# Patient Record
Sex: FEMALE | HISPANIC OR LATINO | URBAN - METROPOLITAN AREA
[De-identification: names, ages, dates, MRNs, and addresses within clinical notes are randomized per-mention and may not be internally consistent; named-entity substitution may affect disease eponyms.]

---

## 2021-03-26 ENCOUNTER — OFFICE VISIT (OUTPATIENT)
Dept: URBAN - METROPOLITAN AREA CLINIC 48 | Facility: CLINIC | Age: 86
End: 2021-03-26
Payer: COMMERCIAL

## 2021-03-26 DIAGNOSIS — H35.3122 NEXDTVE AGE-RELATED MCLR DEGN, LEFT EYE, INTERMED DRY STAGE: ICD-10-CM

## 2021-03-26 PROCEDURE — 92134 CPTRZ OPH DX IMG PST SGM RTA: CPT | Performed by: OPTOMETRIST

## 2021-03-26 PROCEDURE — 99204 OFFICE O/P NEW MOD 45 MIN: CPT | Performed by: OPTOMETRIST

## 2021-03-26 ASSESSMENT — INTRAOCULAR PRESSURE
OS: 15
OD: 11

## 2021-03-26 NOTE — IMPRESSION/PLAN
Impression: Exdtve age-rel mclr degn, right eye, with actv chrdl neovas: H35.3683. 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 Performed Bscan. No mass or detachment was seen. Discussed with Dr. Sigifredo Hernandez (see addendum) He recommends the patient start treatment with ANTI VEGF injections. She elects to proceed and will return to the office at 1:30 today for Avastin in the right eye.

## 2021-03-26 NOTE — IMPRESSION/PLAN
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 Plan: Discussed with patient. Recommend AREDs II, eat healthy vegetables, sun protection, blood pressure control.

## 2021-08-13 ENCOUNTER — OFFICE VISIT (OUTPATIENT)
Dept: URBAN - METROPOLITAN AREA CLINIC 48 | Facility: CLINIC | Age: 86
End: 2021-08-13
Payer: COMMERCIAL

## 2021-08-13 PROCEDURE — 99214 OFFICE O/P EST MOD 30 MIN: CPT | Performed by: OPTOMETRIST

## 2021-09-02 ENCOUNTER — OFFICE VISIT (OUTPATIENT)
Dept: URBAN - METROPOLITAN AREA CLINIC 48 | Facility: CLINIC | Age: 86
End: 2021-09-02
Payer: COMMERCIAL

## 2021-09-02 DIAGNOSIS — H26.491 OTHER SECONDARY CATARACT, RIGHT EYE: ICD-10-CM

## 2021-09-02 DIAGNOSIS — H25.813 COMBINED FORMS OF AGE-RELATED CATARACT, BILATERAL: ICD-10-CM

## 2021-09-02 DIAGNOSIS — H35.3211 EXDTVE AGE-REL MCLR DEGN, RIGHT EYE, WITH ACTV CHRDL NEOVAS: Primary | ICD-10-CM

## 2021-09-02 PROCEDURE — 92014 COMPRE OPH EXAM EST PT 1/>: CPT | Performed by: OPHTHALMOLOGY

## 2021-09-02 ASSESSMENT — KERATOMETRY
OD: 45.13
OS: 44.25

## 2021-09-02 ASSESSMENT — INTRAOCULAR PRESSURE
OD: 10
OS: 10

## 2021-09-02 NOTE — IMPRESSION/PLAN
Impression: Exdtve age-rel mclr degn, right eye, with actv chrdl neovas: H35.3211. Plan: NO view available for OCT Mac. Recommend doing additional injections OD Avastin inj OD x1 To be done: Next Friday 09/10
3wk DE/OCT Mac PLEASE GET Eleazar Kaufman

## 2021-09-02 NOTE — IMPRESSION/PLAN
Impression: Other secondary cataract, right eye: H26.491. Plan: Risks, benefits, alternatives, and expectations of YAG capsulotomy were discussed. The patient desires a YAG capsulotomy in the right eye.   

Schedule YAG capsulotomy in the right eye, RL 2

## 2021-09-02 NOTE — IMPRESSION/PLAN
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 Plan: Macular degeneration, dry type with stable vision. Will continue to monitor vision and the patient has been instructed to call with any vision changes.

## 2021-09-02 NOTE — IMPRESSION/PLAN
Impression: Combined forms of age-related cataract, bilateral: H25.813. Plan: The patient has a visually significant cataract in the left eye. After discussion with the patient and careful examination it has been determined that a cataract in the left eye is accounting for a significant amount of the patient's visual symptoms. Cataract surgery and the associated risks, benefits, alternatives, expectations, and recovery were discussed in detail with the patient. All questions were answered. The patient understands that there may be some limitation in visual potential given any pre-existing ocular disease. Discussed option of eye drops with patient, patient elects Dexycu injection  , discussed possible side effects of drops. The patient desires cataract surgery in the left eye. The patient desires cataract surgery in the left eye. Schedule cataract surgery in the left eye. RL Surgeon: Dr. Debbie Rosado

## 2021-09-20 NOTE — IMPRESSION/PLAN
Impression: Exdtve age-rel mclr degn, right eye, with actv chrdl neovas: H35.3211. Plan: CNV with Macular Edema was seen on exam today. Dr. Sammie Short confirmed and recommends treatment today. Discussed with the patient.  
She will be seen with Dr. Sammie Short today for Avastin injection

## 2022-07-07 ENCOUNTER — OFFICE VISIT (OUTPATIENT)
Dept: URBAN - METROPOLITAN AREA CLINIC 48 | Facility: CLINIC | Age: 87
End: 2022-07-07
Payer: COMMERCIAL

## 2022-07-07 DIAGNOSIS — H35.3122 NEXDTVE AGE-RELATED MCLR DEGN, LEFT EYE, INTERMED DRY STAGE: Primary | ICD-10-CM

## 2022-07-07 DIAGNOSIS — H25.812 COMBINED FORMS OF AGE-RELATED CATARACT, LEFT EYE: ICD-10-CM

## 2022-07-07 PROCEDURE — 99214 OFFICE O/P EST MOD 30 MIN: CPT | Performed by: OPHTHALMOLOGY

## 2022-07-07 ASSESSMENT — KERATOMETRY
OS: 45.00
OD: 45.88

## 2022-07-07 ASSESSMENT — INTRAOCULAR PRESSURE
OD: 13
OS: 14

## 2022-07-07 NOTE — IMPRESSION/PLAN
Impression: Combined forms of age-related cataract, left eye: H25.812. Plan: The patient has a visually significant cataract in the left eye. After discussion with the patient and careful examination it has been determined that a cataract in the left eye is accounting for a significant amount of the patient's visual symptoms. Cataract surgery and the associated risks, benefits, alternatives, expectations, and recovery were discussed in detail with the patient. All questions were answered. The patient understands that there may be some limitation in visual potential given any pre-existing ocular disease. Discussed option of eye drops Prednisolone, Ketorolac and Ofloxacin drops vs DEXYCU, discussed possible side effects of drops. The patient desires cataract surgery in the left eye. The patient desires cataract surgery in the left eye. Do not suspect she had lasik, poor dilation 4 mm Plan for possible ring and Trypan Complex case Recommend DEXYCU and possible gtts. post sx. Patient elects Gemma Silence. Schedule cataract surgery in the left eye. RL 2 Patient candidate for standard lens Surgeon: Dr. Karen Grady Please schedule A-scan

## 2022-07-07 NOTE — IMPRESSION/PLAN
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 Plan: Will continue to monitor vision and the patient has been instructed to call with any vision changes. Okay to proceed with cataract sx. see plan 2 RTC 2 months for DFE with OCT MAC with Dr. Karyn Garcia.

## 2022-07-11 ENCOUNTER — TESTING ONLY (OUTPATIENT)
Dept: URBAN - METROPOLITAN AREA CLINIC 48 | Facility: CLINIC | Age: 87
End: 2022-07-11
Payer: COMMERCIAL

## 2022-07-11 DIAGNOSIS — H25.812 COMBINED FORMS OF AGE-RELATED CATARACT, LEFT EYE: Primary | ICD-10-CM

## 2022-07-11 ASSESSMENT — PACHYMETRY
OS: 22.96
OS: 2.86

## 2022-07-20 ENCOUNTER — SURGERY (OUTPATIENT)
Dept: URBAN - METROPOLITAN AREA SURGERY 26 | Facility: SURGERY | Age: 87
End: 2022-07-20
Payer: COMMERCIAL

## 2022-07-20 DIAGNOSIS — H21.81 FLOPPY IRIS SYNDROME: Primary | ICD-10-CM

## 2022-07-20 DIAGNOSIS — H25.812 COMBINED FORMS OF AGE-RELATED CATARACT, LEFT EYE: ICD-10-CM

## 2022-07-20 PROCEDURE — 66982 XCAPSL CTRC RMVL CPLX WO ECP: CPT | Performed by: STUDENT IN AN ORGANIZED HEALTH CARE EDUCATION/TRAINING PROGRAM

## 2022-07-25 ENCOUNTER — POST-OPERATIVE VISIT (OUTPATIENT)
Dept: URBAN - METROPOLITAN AREA CLINIC 48 | Facility: CLINIC | Age: 87
End: 2022-07-25
Payer: COMMERCIAL

## 2022-07-25 DIAGNOSIS — Z96.1 PRESENCE OF INTRAOCULAR LENS: Primary | ICD-10-CM

## 2022-07-25 PROCEDURE — 99024 POSTOP FOLLOW-UP VISIT: CPT | Performed by: STUDENT IN AN ORGANIZED HEALTH CARE EDUCATION/TRAINING PROGRAM

## 2022-07-25 ASSESSMENT — INTRAOCULAR PRESSURE: OS: 18

## 2022-07-25 NOTE — IMPRESSION/PLAN
Impression: S/P 89984 Complex Cataract Extraction by phacoemulsification with IOL placement OS - 5 Days. Presence of intraocular lens  Z96.1. Plan: IOP within good range today will continue current drop. OS Continue Brim BID 

OU May use OTC AFT up to QID for lubrication.  

RTC as scheduled 07/28/22 with Dr. Collette Zimmerman

## 2022-08-02 ENCOUNTER — POST-OPERATIVE VISIT (OUTPATIENT)
Dept: URBAN - METROPOLITAN AREA CLINIC 48 | Facility: CLINIC | Age: 87
End: 2022-08-02
Payer: COMMERCIAL

## 2022-08-02 DIAGNOSIS — Z96.1 PRESENCE OF INTRAOCULAR LENS: Primary | ICD-10-CM

## 2022-08-02 PROCEDURE — 99024 POSTOP FOLLOW-UP VISIT: CPT | Performed by: STUDENT IN AN ORGANIZED HEALTH CARE EDUCATION/TRAINING PROGRAM

## 2022-08-02 ASSESSMENT — INTRAOCULAR PRESSURE
OD: 14
OS: 11

## 2022-08-02 NOTE — IMPRESSION/PLAN
Impression: S/P 48537 Complex Cataract Extraction by phacoemulsification with IOL placement OS - 13 Days. Presence of intraocular lens  Z96.1. Plan: - Eye is doing well
- d/c Brim
- RT/RD precautions reviewed with the patient
- Can resume normal activities with the exception of avoiding swimming for 1 more week RTC 3 wks.  for PO3

## 2022-08-26 ENCOUNTER — POST-OPERATIVE VISIT (OUTPATIENT)
Dept: URBAN - METROPOLITAN AREA CLINIC 48 | Facility: CLINIC | Age: 87
End: 2022-08-26
Payer: COMMERCIAL

## 2022-08-26 DIAGNOSIS — Z96.1 PRESENCE OF INTRAOCULAR LENS: Primary | ICD-10-CM

## 2022-08-26 PROCEDURE — 99024 POSTOP FOLLOW-UP VISIT: CPT | Performed by: STUDENT IN AN ORGANIZED HEALTH CARE EDUCATION/TRAINING PROGRAM

## 2022-08-26 ASSESSMENT — INTRAOCULAR PRESSURE
OS: 10
OD: 13

## 2022-08-26 NOTE — IMPRESSION/PLAN
Impression: S/P 05956 Complex Cataract Extraction by phacoemulsification with IOL placement OS - 37 Days. Presence of intraocular lens  Z96.1.  Plan: - Vision is doing excellent
- IOL in good position
- no RT/RD noted on exam
- RTC 6 months for repeat DFE